# Patient Record
(demographics unavailable — no encounter records)

---

## 2025-01-21 NOTE — HISTORY OF PRESENT ILLNESS
[FreeTextEntry1] : 35y.o  LMP  [facility] presenting for evaluation of heavy menstrual period last month. Pt reports menses q4-6 weeks. Pt had not had period for 6 weeks, then began to have heavy bleeding on . Notes that she bled through 8-9 pads in 2-3 hours. Pt states that she called a provider she sees in [place] and was instructed to go to ED if bleeding continued. Pt passed a clot shortly after contacting provider and states that bleeding stopped after clot. Pt suspects that she was having miscarriage at that time although she did not take a pregnancy test. Pt has not had any additional bleeding since. Denies pain, dizziness, light headedness.  Pt also endorsing small amounts of milky, white nipple discharge. Stopped breastfeeding late / early . Denies pain/ fevers/bloody discharge.

## 2025-01-21 NOTE — HISTORY OF PRESENT ILLNESS
[FreeTextEntry1] : 35y.o  LMP  [facility] presenting for evaluation of heavy menstrual period last month. Pt reports menses q4-6 weeks. Pt had not had period for 6 weeks, then began to have heavy bleeding on . Notes that she bled through 8-9 pads in 2-3 hours. Pt states that she called a provider she sees in [place] and was instructed to go to ED if bleeding continued. Pt passed a clot shortly after contacting provider and states that bleeding stopped after clot. Pt suspects that she was having miscarriage at that time although she did not take a pregnancy test. Pt has not had any additional bleeding since. Denies pain, dizziness, light headedness.  Pt also endorsing small amounts of milky, white nipple discharge. Stopped breastfeeding late / early . Denies pain/ fevers/bloody discharge.

## 2025-01-21 NOTE — PLAN
[FreeTextEntry1] : 35y.o  LMP  [facility] presenting with concern for miscarriage last month. UPT negative in clinic today. Unable to assess whether pt had miscarriage as evaluation not performed at that time. May have been heavier than normal menstrual period. Pt stable and without any current symptoms of bleeding/ pain.   -Will monitor menses  -Instructed pt to seek evaluation in clinic or ED if heavy bleeding occurs again -Will continue to monitor, referral sent for [facility] if patient continues with irregular menses -Fasting prolactin level to be drawn for evaluation of milky nipple discharge -Pt to return to clinic for follow up in 3-6 months  Discussed with Dr. Nahid Dawson, PGY-1

## 2025-01-21 NOTE — PLAN
[FreeTextEntry1] : 35y.o  LMP  [facility] presenting with concern for miscarriage last month. UPT negative in clinic today. Unable to assess whether pt had miscarriage as evaluation not performed at that time. May have been heavier than normal menstrual period. Pt stable and without any current symptoms of bleeding/ pain.   -Will monitor menses  -Instructed pt to seek evaluation in clinic or ED if heavy bleeding occurs again -Will continue to monitor, referral sent for [facility] if patient continues with irregular menses -Fasting prolactin level to be drawn for evaluation of milky nipple discharge -Pt to return to clinic for follow up in 3-6 months  Discussed with Dr. Nahid Dawson, PGY-1 No

## 2025-01-21 NOTE — PHYSICAL EXAM
[Appropriately responsive] : appropriately responsive [Alert] : alert [Soft] : soft [Non-tender] : non-tender [Non-distended] : non-distended [Examination Of The Breasts] : a normal appearance [Breast Abnormal Secretion Opalescent Fluid] : a milky discharge [Labia Majora] : normal [Labia Minora] : normal [Normal] : normal [FreeTextEntry8] : wnl

## 2025-03-14 NOTE — HISTORY OF PRESENT ILLNESS
[Definite:  ___ (Date)] : the last menstrual period was [unfilled] [Normal Amount/Duration] : was abnormal [Spotting Between  Menses] : no spotting between menses [Regular Cycle Intervals] : periods have been irregular [Sexually Active] : is sexually active [Monogamous] : is monogamous [Contraception] : does not use contraception